# Patient Record
Sex: FEMALE | Race: BLACK OR AFRICAN AMERICAN | NOT HISPANIC OR LATINO | Employment: FULL TIME | URBAN - METROPOLITAN AREA
[De-identification: names, ages, dates, MRNs, and addresses within clinical notes are randomized per-mention and may not be internally consistent; named-entity substitution may affect disease eponyms.]

---

## 2024-01-02 ENCOUNTER — HOSPITAL ENCOUNTER (EMERGENCY)
Facility: CLINIC | Age: 29
Discharge: LEFT BEFORE DISCHARGE | End: 2024-01-02
Attending: EMERGENCY MEDICINE | Admitting: EMERGENCY MEDICINE

## 2024-01-02 VITALS
HEART RATE: 102 BPM | OXYGEN SATURATION: 100 % | TEMPERATURE: 97.9 F | SYSTOLIC BLOOD PRESSURE: 115 MMHG | WEIGHT: 200 LBS | DIASTOLIC BLOOD PRESSURE: 76 MMHG | HEIGHT: 63 IN | BODY MASS INDEX: 35.44 KG/M2 | RESPIRATION RATE: 18 BRPM

## 2024-01-02 LAB
ALBUMIN UR-MCNC: NEGATIVE MG/DL
APPEARANCE UR: CLEAR
BILIRUB UR QL STRIP: NEGATIVE
COLOR UR AUTO: ABNORMAL
FLUAV RNA SPEC QL NAA+PROBE: NEGATIVE
FLUBV RNA RESP QL NAA+PROBE: NEGATIVE
GLUCOSE UR STRIP-MCNC: NEGATIVE MG/DL
HCG UR QL: NEGATIVE
HGB UR QL STRIP: NEGATIVE
KETONES UR STRIP-MCNC: NEGATIVE MG/DL
LEUKOCYTE ESTERASE UR QL STRIP: NEGATIVE
MUCOUS THREADS #/AREA URNS LPF: PRESENT /LPF
NITRATE UR QL: NEGATIVE
PH UR STRIP: 7 [PH] (ref 5–7)
RBC URINE: 1 /HPF
RSV RNA SPEC NAA+PROBE: NEGATIVE
SARS-COV-2 RNA RESP QL NAA+PROBE: NEGATIVE
SP GR UR STRIP: 1.02 (ref 1–1.03)
SQUAMOUS EPITHELIAL: 5 /HPF
UROBILINOGEN UR STRIP-MCNC: <2 MG/DL
WBC URINE: 2 /HPF

## 2024-01-02 PROCEDURE — 81025 URINE PREGNANCY TEST: CPT | Performed by: EMERGENCY MEDICINE

## 2024-01-02 PROCEDURE — 87637 SARSCOV2&INF A&B&RSV AMP PRB: CPT | Performed by: EMERGENCY MEDICINE

## 2024-01-02 PROCEDURE — 81001 URINALYSIS AUTO W/SCOPE: CPT | Performed by: EMERGENCY MEDICINE

## 2024-01-02 PROCEDURE — 99281 EMR DPT VST MAYX REQ PHY/QHP: CPT

## 2024-01-03 NOTE — ED TRIAGE NOTES
Patient here for generalized weakness that started 3-5 days ago. Also reports being concerned of not getting menstrual period since 11/27. Patient unsure of pregnancy status. Verbalizes intermittent abdominal discomfort as well. No other concerns voiced.      Triage Assessment (Adult)       Row Name 01/02/24 1926          Triage Assessment    Airway WDL WDL        Respiratory WDL    Respiratory WDL WDL        Skin Circulation/Temperature WDL    Skin Circulation/Temperature WDL WDL        Cardiac WDL    Cardiac WDL WDL        Peripheral/Neurovascular WDL    Peripheral Neurovascular WDL WDL        Cognitive/Neuro/Behavioral WDL    Cognitive/Neuro/Behavioral WDL WDL

## 2024-01-26 ENCOUNTER — HOSPITAL ENCOUNTER (EMERGENCY)
Facility: HOSPITAL | Age: 29
Discharge: HOME | End: 2024-01-26

## 2024-01-26 VITALS
HEART RATE: 82 BPM | OXYGEN SATURATION: 99 % | TEMPERATURE: 97.6 F | RESPIRATION RATE: 18 BRPM | SYSTOLIC BLOOD PRESSURE: 140 MMHG | DIASTOLIC BLOOD PRESSURE: 77 MMHG

## 2024-01-26 DIAGNOSIS — U07.1 COVID-19: ICD-10-CM

## 2024-01-26 DIAGNOSIS — Z34.90 INTRAUTERINE PREGNANCY: ICD-10-CM

## 2024-01-26 DIAGNOSIS — O21.9 NAUSEA AND VOMITING IN PREGNANCY: ICD-10-CM

## 2024-01-26 LAB
FLUAV RNA SPEC QL NAA+PROBE: NEGATIVE
FLUBV RNA RESP QL NAA+PROBE: NEGATIVE
RSV RNA SPEC NAA+PROBE: NEGATIVE
SARS-COV-2 RNA RESP QL NAA+PROBE: POSITIVE

## 2024-01-26 PROCEDURE — 87637 SARSCOV2&INF A&B&RSV AMP PRB: CPT | Performed by: STUDENT IN AN ORGANIZED HEALTH CARE EDUCATION/TRAINING PROGRAM

## 2024-01-26 PROCEDURE — 99283 EMERGENCY DEPT VISIT LOW MDM: CPT

## 2024-01-26 ASSESSMENT — ENCOUNTER SYMPTOMS
FREQUENCY: 0
DIARRHEA: 0
SORE THROAT: 0
BRUISES/BLEEDS EASILY: 0
DYSURIA: 0
CHILLS: 0
CONSTIPATION: 0
COUGH: 0
VOMITING: 1
HEMATURIA: 0
SHORTNESS OF BREATH: 0
RHINORRHEA: 0
FEVER: 0
BLOOD IN STOOL: 0
NAUSEA: 1
ABDOMINAL PAIN: 0

## 2024-01-26 ASSESSMENT — ACTIVITIES OF DAILY LIVING (ADL): ADLS_ACUITY_SCORE: 35

## 2024-01-26 NOTE — ED TRIAGE NOTES
Patient here for covid test after her work instructed she present for one, states she has no symptoms of covid patient asked her work if she should go to urgent care they instructed her to stay here.

## 2024-01-26 NOTE — Clinical Note
Roger Chavira was seen and treated in our emergency department on 1/26/2024.  She may return to work on 02/01/2024.  Or sooner if symptoms resolved and fever-free without fever-reducing medications.      If you have any questions or concerns, please don't hesitate to call.      Jyoti Conway PA-C

## 2024-01-26 NOTE — DISCHARGE INSTRUCTIONS
You tested positive for COVID. Please quarantine and practice good hand hygiene until symptoms resolve and you are fever-free without fever-reducing medications.    Please follow up with [facility] or [facility] for reevaluation and ongoing management of pregnancy/termination options, referral was placed today.    Return to the ER if any new or worsening symptoms develop.    Take Care!  -Jyoti Conway PA-C

## 2024-01-26 NOTE — ED PROVIDER NOTES
EMERGENCY DEPARTMENT ENCOUNTER      NAME: Roger Chavira  AGE: 29 year old female  YOB: 1995  MRN: [id number]  EVALUATION DATE & TIME: 2024  2:12 PM    PCP: No Ref-Primary, Physician    ED PROVIDER: Jyoti Conway PA-C      Chief Complaint   Patient presents with    needs covid test for work         FINAL IMPRESSION:  1. COVID-19    2. Nausea and vomiting in pregnancy    3. Intrauterine pregnancy          ED COURSE & MEDICAL DECISION MAKIN:31 PM Attempted to meet with patient. Patient not in room.   3:03 PM Spoke with lab who confirmed COVID positive result.  3:05 PM Met with patient for initial interview. Plan for care discussed. I discussed the plan for discharge with the patient, and patient is agreeable. We discussed supportive cares at home and reasons for return to the ER including new or worsening symptoms. All questions and concerns addressed. Patient to be discharged by RN.    29 year old female ~7 weeks gestation presents to the Emergency Department for COVID test per patient's employer's request. Patient vomited while at work and was advised to present to the ER for COVID testing. Upon exam, patient is afebrile, hemodynamically stable, and in no acute distress. Benign physical exam as documented below. Differential diagnosis includes but not limited to COVID, influenza, RSV, nausea/vomiting in pregnancy. Based on patient's presenting symptoms, laboratories were ordered. COVID positive, influenza/RSV negative.    Symptoms and workup most consistent with COVID and nausea/vomiting in pregnancy. Patient was made aware of the above findings. Per chart review, IUP confirmed on 24. Patient is interested in terminating her pregnancy. Plan to discharge patient home with symptomatic management, strict return precautions, and close follow up with OB/GYN for reevaluation and ongoing management - referral placed today. The patient was stable and well appearing upon discharge. The  patient was advised to return to the ER if any new or worsening symptoms develop. The patient verbalizes understanding and agrees with the plan.     Medical Decision Making  Obtained supplemental history:Supplemental history obtained?: No  Reviewed external records: External records reviewed?: Documented in chart  Care impacted by chronic illness:Other: pregnancy  Care significantly affected by social determinants of health:Access to Medical Care  Did you consider but not order tests?: Work up considered but not performed and documented in chart, if applicable  Did you interpret images independently?: Independent interpretation of ECG and images noted in documentation, when applicable.  Consultation discussion with other provider:Did you involve another provider (consultant, , pharmacy, etc.)?: No  Discharge. No recommendations on prescription strength medication(s). See documentation for any additional details.    MEDICATIONS GIVEN IN THE EMERGENCY:  Medications - No data to display    NEW PRESCRIPTIONS STARTED AT TODAY'S ER VISIT  There are no discharge medications for this patient.         =================================================================    HPI    Patient information was obtained from: patient    Use of : N/A       Roger Chavira is a 29 year old female who presents to this ED for COVID testing per request from her employer.  Patient reports she is approximately 7 weeks pregnant.  She notes that she felt nauseated and vomited at work which she attributes to pregnancy.  Her employer advised she present to the ER for COVID testing.  She notes she is otherwise asymptomatic.  Per chart review, IUP confirmed on 24. Patient is interested in terminating her pregnancy.  She reportedly followed [facility] with [facility] to discuss termination options, but she was unable to get scheduled until late March for an .  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  She denies any known exposures to COVID, flu, RSV.  She does state that she is vaccinated against COVID.      REVIEW OF SYSTEMS   Review of Systems   Constitutional:  Negative for chills and fever.   HENT:  Negative for congestion, rhinorrhea and sore throat.    Respiratory:  Negative for cough and shortness of breath.    Cardiovascular:  Negative for chest pain.   Gastrointestinal:  Positive for nausea and vomiting. Negative for abdominal pain, blood in stool, constipation and diarrhea.   Genitourinary:  Negative for dysuria, frequency, hematuria, urgency, vaginal bleeding and vaginal discharge.   Skin:  Negative for rash.   Hematological:  Does not bruise/bleed easily.        PAST MEDICAL HISTORY:  No past medical history on file.    PAST SURGICAL HISTORY:  No past surgical history on file.        CURRENT MEDICATIONS:    No current outpatient medications on file.      ALLERGIES:  Allergies   Allergen Reactions    Cats Difficulty breathing and Cough       FAMILY HISTORY:  No family history on file.    SOCIAL HISTORY:   Social History     Socioeconomic History    Marital status: Single       VITALS:  BP (!) 140/77   Pulse 82   Temp 97.6  F (36.4  C) (Oral)   Resp 18   LMP 11/26/2023 (Approximate)   SpO2 99%     PHYSICAL EXAM    Constitutional:  Alert, in no acute distress. Cooperative.  EYES: Conjunctivae clear.  HENT:  Atraumatic, normocephalic.  Respiratory:  Respirations even, unlabored, in no acute respiratory distress.  Cardiovascular:  Regular rate, good peripheral perfusion.  GI: Soft, flat, non-distended.  Musculoskeletal:  No edema. No cyanosis. Range of motion major extremities intact.    Integument: Warm, Dry.   Neurologic:  Alert & oriented. No focal deficits noted.  Psych: Normal mood and affect.      LAB:  All pertinent labs reviewed and interpreted.  Results for orders placed or performed during the hospital encounter of 01/26/24   Asymptomatic Influenza A/B, RSV, & SARS-CoV2 PCR  (COVID-19) Nasopharyngeal    Specimen: Nasopharyngeal; Swab   Result Value Ref Range    Influenza A PCR Negative Negative    Influenza B PCR Negative Negative    RSV PCR Negative Negative    SARS CoV2 PCR Positive (A) Negative       RADIOLOGY:  Reviewed all pertinent imaging. Please see official radiology report.  No orders to display     Jyoti Conway PA-C  [facility] EMERGENCY DEPARTMENT  [address] [place] [id number]  [phone]      Jyoti Conway PA-C  01/26/24 [id number]

## 2024-01-29 ENCOUNTER — TELEPHONE (OUTPATIENT)
Dept: OBGYN | Facility: CLINIC | Age: 29
End: 2024-01-29

## 2024-01-29 NOTE — TELEPHONE ENCOUNTER
"Pt seen in ED on 2024: \" Patient is interested in terminating her pregnancy.  She reportedly followed up with [facility] to discuss termination options, but she was unable to get scheduled until late March for an .  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 \"    Per 24 US: \"IMPRESSION:   1.  An intrauterine gestational sac with mean sac diameter of 9 mm, corresponding to a gestational age of 5 weeks and 4 days. A small yolk sac and possible fetal pole each measuring 1 mm are visualized. Fetal cardiac activity is not definitively visualized, likely due to very early gestation. Correlate with beta hCG trends and consider a follow-up ultrasound in 7-11 days to assess for viability.   2.  No adnexal masses. No free fluid. \"    RN attempted to call pt. Unable to leave message as VM was full.    Ann Marie Smith RN on 2024 at 12:01 PM    "

## 2024-01-30 NOTE — TELEPHONE ENCOUNTER
Pt had an US on 1/14/23 that showed she had an IUP of 5w4d.    Scheduled pt for a MTOP with Dr. Edmonds on 2/8.    Routing to Dr. Edmonds as an FYI.    Emily Degroot RN